# Patient Record
Sex: FEMALE | Race: BLACK OR AFRICAN AMERICAN | NOT HISPANIC OR LATINO | URBAN - METROPOLITAN AREA
[De-identification: names, ages, dates, MRNs, and addresses within clinical notes are randomized per-mention and may not be internally consistent; named-entity substitution may affect disease eponyms.]

---

## 2023-01-10 ENCOUNTER — OFFICE VISIT (OUTPATIENT)
Dept: URBAN - METROPOLITAN AREA CLINIC 44 | Facility: CLINIC | Age: 33
End: 2023-01-10
Payer: COMMERCIAL

## 2023-01-10 VITALS
WEIGHT: 213.4 LBS | HEART RATE: 71 BPM | TEMPERATURE: 98.6 F | SYSTOLIC BLOOD PRESSURE: 124 MMHG | DIASTOLIC BLOOD PRESSURE: 69 MMHG | BODY MASS INDEX: 39.27 KG/M2 | HEIGHT: 62 IN

## 2023-01-10 DIAGNOSIS — R19.8 CHANGE IN BOWEL FUNCTION: ICD-10-CM

## 2023-01-10 PROCEDURE — 99204 OFFICE O/P NEW MOD 45 MIN: CPT | Performed by: INTERNAL MEDICINE

## 2023-01-10 RX ORDER — CHOLECALCIFEROL (VITAMIN D3) 50 MCG
1 TABLET TABLET ORAL ONCE A DAY
Status: ACTIVE | COMMUNITY

## 2023-01-10 RX ORDER — CARBAMAZEPINE 200 MG/1
TAKE 1 TABLET BY MOUTH EVERY MORNING AND 2 TABLETS EVERY EVENING TABLET ORAL
Qty: 90 EACH | Refills: 1 | Status: ACTIVE | COMMUNITY

## 2023-01-10 RX ORDER — DICYCLOMINE HYDROCHLORIDE 10 MG/1
1 TABLET CAPSULE ORAL
Qty: 40 | Refills: 3 | OUTPATIENT
Start: 2023-01-10 | End: 2023-02-19

## 2023-01-10 RX ORDER — FERROUS SULFATE 325(65) MG
1 TABLET TABLET ORAL ONCE A DAY
Status: ACTIVE | COMMUNITY

## 2023-01-10 NOTE — HPI-TODAY'S VISIT:
Pt with 2 attacks of severe lower abdominal pain and diarrhea 6 weeks apart. No sick contacts or travel; no abx; no hx of similar attacks in the past. Usually mild constipation. Did have some blood in the stools with attacks. To ER for both attacks: 10/26/2022 and 12/16/2022. CT during both without GI pathology.Labs 12/2022 with WBC 14K; ALT 57. Pt states given abx both times with resolution. Last week mild pain again but did not get diarrhea. No family hx of CRC or IBD. No weight loss or systemic complaints.

## 2023-01-26 ENCOUNTER — OFFICE VISIT (OUTPATIENT)
Dept: URBAN - METROPOLITAN AREA SURGERY CENTER 27 | Facility: SURGERY CENTER | Age: 33
End: 2023-01-26

## 2023-07-18 ENCOUNTER — DASHBOARD ENCOUNTERS (OUTPATIENT)
Age: 33
End: 2023-07-18

## 2023-07-25 ENCOUNTER — OFFICE VISIT (OUTPATIENT)
Dept: URBAN - METROPOLITAN AREA CLINIC 44 | Facility: CLINIC | Age: 33
End: 2023-07-25
Payer: COMMERCIAL

## 2023-07-25 VITALS
TEMPERATURE: 98.3 F | WEIGHT: 205.4 LBS | DIASTOLIC BLOOD PRESSURE: 74 MMHG | BODY MASS INDEX: 37.8 KG/M2 | HEIGHT: 62 IN | SYSTOLIC BLOOD PRESSURE: 147 MMHG | HEART RATE: 76 BPM

## 2023-07-25 DIAGNOSIS — R14.0 ABDOMINAL BLOATING: ICD-10-CM

## 2023-07-25 DIAGNOSIS — K21.9 GASTROESOPHAGEAL REFLUX DISEASE WITHOUT ESOPHAGITIS: ICD-10-CM

## 2023-07-25 PROBLEM — 266435005: Status: ACTIVE | Noted: 2023-07-25

## 2023-07-25 PROCEDURE — 99214 OFFICE O/P EST MOD 30 MIN: CPT | Performed by: INTERNAL MEDICINE

## 2023-07-25 RX ORDER — PHENOL 1.4 %
AS DIRECTED AEROSOL, SPRAY (ML) MUCOUS MEMBRANE
Status: ACTIVE | COMMUNITY

## 2023-07-25 RX ORDER — DICYCLOMINE HYDROCHLORIDE 10 MG/1
2 CAPSULES CAPSULE ORAL THREE TIMES A DAY
Status: ACTIVE | COMMUNITY

## 2023-07-25 RX ORDER — HYDROCODONE BITARTRATE AND ACETAMINOPHEN 5; 325 MG/1; MG/1
1 TABLET AS NEEDED TABLET ORAL
Status: ACTIVE | COMMUNITY

## 2023-07-25 RX ORDER — CIPROFLOXACIN HYDROCHLORIDE 500 MG/1
1 TABLET TABLET, FILM COATED ORAL
Qty: 20 TABLET | Refills: 0 | OUTPATIENT
Start: 2023-07-25 | End: 2023-08-04

## 2023-07-25 RX ORDER — PANTOPRAZOLE SODIUM 40 MG/1
1 TABLET TABLET, DELAYED RELEASE ORAL ONCE A DAY
Qty: 90 | Refills: 3 | OUTPATIENT
Start: 2023-07-25

## 2023-07-25 RX ORDER — METRONIDAZOLE 500 MG/1
1 TABLET TABLET ORAL TWICE A DAY
Qty: 20 TABLET | Refills: 0 | OUTPATIENT
Start: 2023-07-25 | End: 2023-08-04

## 2023-07-25 RX ORDER — OCTISALATE, AVOBENZONE, HOMOSALATE, AND OCTOCRYLENE 29.4; 29.4; 49; 25.48 MG/ML; MG/ML; MG/ML; MG/ML
ONE LOTION TOPICAL
Qty: 60 | Refills: 0 | OUTPATIENT
Start: 2023-07-25 | End: 2023-09-23

## 2023-07-25 RX ORDER — CYANOCOBALAMIN (VITAMIN B-12) 2500 MCG
AS DIRECTED TABLET, SUBLINGUAL SUBLINGUAL
Status: ACTIVE | COMMUNITY

## 2023-07-25 NOTE — HPI-TODAY'S VISIT:
12/2022: New pt with 2 attacks of severe lower abdominal pain and diarrhea 6 weeks apart. No sick contacts or travel; no abx; no hx of similar attacks in the past. Usually mild constipation. Did have some blood in the stools with attacks. To ER for both attacks: 10/26/2022 and 12/16/2022. CT during both without GI pathology. Labs 12/2022 with WBC 14K; ALT 57. Pt states given abx both times with resolution. Last week mild pain again but did not get diarrhea. No family hx of CRC or IBD. No weight loss or systemic complains. IBD a concern and a colonocsopy advised but pt did not complete  7/25/2023: Pt now presents wth persistent post prandial bloating despite a bland diet; GERD requiring daily Omeprazole OTC. No more attacks of diarrhea. No change in bowels or new bleeding. No systemic complaints or weight loss.

## 2024-02-15 ENCOUNTER — OV EP (OUTPATIENT)
Dept: URBAN - METROPOLITAN AREA CLINIC 48 | Facility: CLINIC | Age: 34
End: 2024-02-15

## 2024-07-16 ENCOUNTER — OFFICE VISIT (OUTPATIENT)
Dept: URBAN - METROPOLITAN AREA CLINIC 48 | Facility: CLINIC | Age: 34
End: 2024-07-16
Payer: COMMERCIAL

## 2024-07-16 VITALS
HEIGHT: 62 IN | OXYGEN SATURATION: 99 % | TEMPERATURE: 97.9 F | BODY MASS INDEX: 38.16 KG/M2 | HEART RATE: 89 BPM | WEIGHT: 207.4 LBS | SYSTOLIC BLOOD PRESSURE: 119 MMHG | DIASTOLIC BLOOD PRESSURE: 82 MMHG

## 2024-07-16 DIAGNOSIS — D50.8 OTHER IRON DEFICIENCY ANEMIA: ICD-10-CM

## 2024-07-16 DIAGNOSIS — K21.9 GASTROESOPHAGEAL REFLUX DISEASE WITHOUT ESOPHAGITIS: ICD-10-CM

## 2024-07-16 PROCEDURE — 99213 OFFICE O/P EST LOW 20 MIN: CPT | Performed by: INTERNAL MEDICINE

## 2024-07-16 RX ORDER — PANTOPRAZOLE SODIUM 20 MG/1
1 TABLET TABLET, DELAYED RELEASE ORAL ONCE A DAY
Qty: 90 TABLET | Refills: 3 | OUTPATIENT
Start: 2024-07-16

## 2024-07-16 RX ORDER — HYDROCODONE BITARTRATE AND ACETAMINOPHEN 5; 325 MG/1; MG/1
1 TABLET AS NEEDED TABLET ORAL
Status: ACTIVE | COMMUNITY

## 2024-07-16 RX ORDER — PHENOL 1.4 %
AS DIRECTED AEROSOL, SPRAY (ML) MUCOUS MEMBRANE
Status: ON HOLD | COMMUNITY

## 2024-07-16 RX ORDER — DICYCLOMINE HYDROCHLORIDE 10 MG/1
2 CAPSULES CAPSULE ORAL THREE TIMES A DAY
Status: ACTIVE | COMMUNITY

## 2024-07-16 RX ORDER — CYANOCOBALAMIN (VITAMIN B-12) 2500 MCG
AS DIRECTED TABLET, SUBLINGUAL SUBLINGUAL
Status: ACTIVE | COMMUNITY

## 2024-07-16 RX ORDER — PANTOPRAZOLE SODIUM 40 MG/1
1 TABLET TABLET, DELAYED RELEASE ORAL ONCE A DAY
Qty: 90 | Refills: 3 | Status: ACTIVE | COMMUNITY
Start: 2023-07-25

## 2024-07-16 NOTE — HPI-TODAY'S VISIT:
12/2022: New pt with 2 attacks of severe lower abdominal pain and diarrhea 6 weeks apart. No sick contacts or travel; no abx; no hx of similar attacks in the past. Usually mild constipation. Did have some blood in the stools with attacks. To ER for both attacks: 10/26/2022 and 12/16/2022. CT during both without GI pathology. Labs 12/2022 with WBC 14K; ALT 57. Pt states given abx both times with resolution. Last week mild pain again but did not get diarrhea. No family hx of CRC or IBD. No weight loss or systemic complains. IBD a concern and a colonoscopy advised but pt did not complete  7/25/2023: Pt now presents wth persistent post prandial bloating despite a bland diet; GERD requiring daily Omeprazole OTC. No more attacks of diarrhea. No change in bowels or new bleeding. No systemic complaints or weight loss. She was treated with Cipro/Flagyl followed by [facility] and daily Pantoprazole started.  7/16/24: Pt returns for medication refills on Pantoprazole 40 mg daily; she denies reflux symptoms and has missed days without symptoms; has not tried to discontinue. No dysphagia. Her bowels are fairly regular with occasional constipation which is relieved with PRN Miralax. She has not had any attacks of abdominal pain and has not neeed to take Dicyclomine. Labs 3/2024 with unremarkable CMP and mild anemia with Hgb 11.6. She has a follow up with her PCP and labs at the end of the month. She admits to heavy menstrual bleeding and denies any melena or hematochezia.